# Patient Record
Sex: FEMALE | URBAN - METROPOLITAN AREA
[De-identification: names, ages, dates, MRNs, and addresses within clinical notes are randomized per-mention and may not be internally consistent; named-entity substitution may affect disease eponyms.]

---

## 2019-10-23 ENCOUNTER — OFFICE VISIT (OUTPATIENT)
Dept: URBAN - METROPOLITAN AREA CLINIC 58 | Facility: CLINIC | Age: 24
End: 2019-10-23
Payer: COMMERCIAL

## 2019-10-23 PROCEDURE — 92004 COMPRE OPH EXAM NEW PT 1/>: CPT | Performed by: OPTOMETRIST

## 2019-10-23 RX ORDER — NEOMYCIN SULFATE, POLYMYXIN B SULFATE AND DEXAMETHASONE 3.5; 10000; 1 MG/ML; [USP'U]/ML; MG/ML
SUSPENSION OPHTHALMIC
Qty: 1 | Refills: 0 | Status: ACTIVE
Start: 2019-10-23

## 2019-10-23 ASSESSMENT — INTRAOCULAR PRESSURE
OD: 14
OS: 14

## 2019-10-23 NOTE — IMPRESSION/PLAN
Impression: Serous conjunctivitis, except viral, left eye: H10.232. Plan: Discussed diagnosis in detail with patient. Advised patient of condition. Will continue to observe condition and or symptoms. Start Maxitrol QID in the left eye x 1 wk. Call if worsens. Possible refraction next visit.

## 2019-10-29 ENCOUNTER — OFFICE VISIT (OUTPATIENT)
Dept: URBAN - NONMETROPOLITAN AREA CLINIC 10 | Facility: CLINIC | Age: 24
End: 2019-10-29
Payer: COMMERCIAL

## 2019-10-29 DIAGNOSIS — H10.232: Primary | ICD-10-CM

## 2019-10-29 PROCEDURE — 99213 OFFICE O/P EST LOW 20 MIN: CPT | Performed by: OPTOMETRIST

## 2019-10-29 ASSESSMENT — INTRAOCULAR PRESSURE
OS: 14
OD: 14

## 2019-10-29 ASSESSMENT — VISUAL ACUITY: OS: 20/20

## 2019-10-29 NOTE — IMPRESSION/PLAN
Impression: Serous conjunctivitis, except viral, left eye: H10.232. Plan: Discussed diagnosis in detail with patient. Advised patient of condition. Will continue to observe condition and or symptoms. Continue Maxitrol QID in the left eye for 2 more days, then stop. Call if worsens. Possible refraction next visit.

## 2019-11-18 ENCOUNTER — OFFICE VISIT (OUTPATIENT)
Dept: URBAN - NONMETROPOLITAN AREA CLINIC 10 | Facility: CLINIC | Age: 24
End: 2019-11-18
Payer: COMMERCIAL

## 2019-11-18 DIAGNOSIS — H52.13 MYOPIA, BILATERAL: Primary | ICD-10-CM

## 2019-11-18 DIAGNOSIS — Q14.1 CONGENITAL MALFORMATION OF RETINA: ICD-10-CM

## 2019-11-18 PROCEDURE — 92014 COMPRE OPH EXAM EST PT 1/>: CPT | Performed by: OPTOMETRIST

## 2019-11-18 ASSESSMENT — VISUAL ACUITY
OD: 20/20
OS: 20/20

## 2019-11-18 ASSESSMENT — INTRAOCULAR PRESSURE
OS: 13
OD: 13

## 2019-11-18 NOTE — IMPRESSION/PLAN
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 Plan: Discussed diagnosis in detail with patient. No treatment is required at this time. Will continue to observe condition and or symptoms. Call if [address] worsens.